# Patient Record
Sex: FEMALE | Race: ASIAN | NOT HISPANIC OR LATINO | URBAN - METROPOLITAN AREA
[De-identification: names, ages, dates, MRNs, and addresses within clinical notes are randomized per-mention and may not be internally consistent; named-entity substitution may affect disease eponyms.]

---

## 2023-05-30 ENCOUNTER — EMERGENCY (EMERGENCY)
Facility: HOSPITAL | Age: 23
LOS: 0 days | Discharge: HOME | End: 2023-05-31
Attending: EMERGENCY MEDICINE
Payer: MEDICAID

## 2023-05-30 DIAGNOSIS — R00.0 TACHYCARDIA, UNSPECIFIED: ICD-10-CM

## 2023-05-30 DIAGNOSIS — R06.00 DYSPNEA, UNSPECIFIED: ICD-10-CM

## 2023-05-30 DIAGNOSIS — J02.9 ACUTE PHARYNGITIS, UNSPECIFIED: ICD-10-CM

## 2023-05-30 DIAGNOSIS — F17.210 NICOTINE DEPENDENCE, CIGARETTES, UNCOMPLICATED: ICD-10-CM

## 2023-05-30 DIAGNOSIS — R91.8 OTHER NONSPECIFIC ABNORMAL FINDING OF LUNG FIELD: ICD-10-CM

## 2023-05-30 DIAGNOSIS — R50.9 FEVER, UNSPECIFIED: ICD-10-CM

## 2023-05-30 DIAGNOSIS — Z86.19 PERSONAL HISTORY OF OTHER INFECTIOUS AND PARASITIC DISEASES: ICD-10-CM

## 2023-05-30 DIAGNOSIS — K11.5 SIALOLITHIASIS: ICD-10-CM

## 2023-05-30 DIAGNOSIS — R09.89 OTHER SPECIFIED SYMPTOMS AND SIGNS INVOLVING THE CIRCULATORY AND RESPIRATORY SYSTEMS: ICD-10-CM

## 2023-05-30 LAB
BASOPHILS # BLD AUTO: 0.08 K/UL — SIGNIFICANT CHANGE UP (ref 0–0.2)
BASOPHILS NFR BLD AUTO: 0.5 % — SIGNIFICANT CHANGE UP (ref 0–1)
EOSINOPHIL # BLD AUTO: 0.02 K/UL — SIGNIFICANT CHANGE UP (ref 0–0.7)
EOSINOPHIL NFR BLD AUTO: 0.1 % — SIGNIFICANT CHANGE UP (ref 0–8)
HCT VFR BLD CALC: 41.3 % — SIGNIFICANT CHANGE UP (ref 37–47)
HGB BLD-MCNC: 13.7 G/DL — SIGNIFICANT CHANGE UP (ref 12–16)
IMM GRANULOCYTES NFR BLD AUTO: 0.4 % — HIGH (ref 0.1–0.3)
LYMPHOCYTES # BLD AUTO: 23.6 % — SIGNIFICANT CHANGE UP (ref 20.5–51.1)
LYMPHOCYTES # BLD AUTO: 3.99 K/UL — HIGH (ref 1.2–3.4)
MCHC RBC-ENTMCNC: 28.7 PG — SIGNIFICANT CHANGE UP (ref 27–31)
MCHC RBC-ENTMCNC: 33.2 G/DL — SIGNIFICANT CHANGE UP (ref 32–37)
MCV RBC AUTO: 86.4 FL — SIGNIFICANT CHANGE UP (ref 81–99)
MONOCYTES # BLD AUTO: 1.16 K/UL — HIGH (ref 0.1–0.6)
MONOCYTES NFR BLD AUTO: 6.9 % — SIGNIFICANT CHANGE UP (ref 1.7–9.3)
NEUTROPHILS # BLD AUTO: 11.62 K/UL — HIGH (ref 1.4–6.5)
NEUTROPHILS NFR BLD AUTO: 68.5 % — SIGNIFICANT CHANGE UP (ref 42.2–75.2)
NRBC # BLD: 0 /100 WBCS — SIGNIFICANT CHANGE UP (ref 0–0)
PLATELET # BLD AUTO: 246 K/UL — SIGNIFICANT CHANGE UP (ref 130–400)
PMV BLD: 9.6 FL — SIGNIFICANT CHANGE UP (ref 7.4–10.4)
RBC # BLD: 4.78 M/UL — SIGNIFICANT CHANGE UP (ref 4.2–5.4)
RBC # FLD: 14.7 % — HIGH (ref 11.5–14.5)
WBC # BLD: 16.93 K/UL — HIGH (ref 4.8–10.8)
WBC # FLD AUTO: 16.93 K/UL — HIGH (ref 4.8–10.8)

## 2023-05-30 PROCEDURE — 70360 X-RAY EXAM OF NECK: CPT

## 2023-05-30 PROCEDURE — 0241U: CPT

## 2023-05-30 PROCEDURE — 99291 CRITICAL CARE FIRST HOUR: CPT | Mod: 25

## 2023-05-30 PROCEDURE — 71045 X-RAY EXAM CHEST 1 VIEW: CPT

## 2023-05-30 PROCEDURE — 99291 CRITICAL CARE FIRST HOUR: CPT

## 2023-05-30 PROCEDURE — 36415 COLL VENOUS BLD VENIPUNCTURE: CPT

## 2023-05-30 PROCEDURE — 80053 COMPREHEN METABOLIC PANEL: CPT

## 2023-05-30 PROCEDURE — 84703 CHORIONIC GONADOTROPIN ASSAY: CPT

## 2023-05-30 PROCEDURE — 96374 THER/PROPH/DIAG INJ IV PUSH: CPT

## 2023-05-30 PROCEDURE — 94640 AIRWAY INHALATION TREATMENT: CPT

## 2023-05-30 PROCEDURE — 70491 CT SOFT TISSUE NECK W/DYE: CPT | Mod: MA

## 2023-05-30 PROCEDURE — 96375 TX/PRO/DX INJ NEW DRUG ADDON: CPT

## 2023-05-30 PROCEDURE — 85025 COMPLETE CBC W/AUTO DIFF WBC: CPT

## 2023-05-30 RX ORDER — IPRATROPIUM/ALBUTEROL SULFATE 18-103MCG
3 AEROSOL WITH ADAPTER (GRAM) INHALATION
Refills: 0 | Status: COMPLETED | OUTPATIENT
Start: 2023-05-30 | End: 2023-05-31

## 2023-05-30 RX ORDER — FAMOTIDINE 10 MG/ML
20 INJECTION INTRAVENOUS ONCE
Refills: 0 | Status: DISCONTINUED | OUTPATIENT
Start: 2023-05-30 | End: 2023-05-30

## 2023-05-30 RX ORDER — FAMOTIDINE 10 MG/ML
20 INJECTION INTRAVENOUS ONCE
Refills: 0 | Status: COMPLETED | OUTPATIENT
Start: 2023-05-30 | End: 2023-05-30

## 2023-05-30 RX ORDER — DIPHENHYDRAMINE HCL 50 MG
50 CAPSULE ORAL ONCE
Refills: 0 | Status: DISCONTINUED | OUTPATIENT
Start: 2023-05-30 | End: 2023-05-30

## 2023-05-30 RX ORDER — EPINEPHRINE 11.25MG/ML
0.5 SOLUTION, NON-ORAL INHALATION ONCE
Refills: 0 | Status: COMPLETED | OUTPATIENT
Start: 2023-05-30 | End: 2023-05-30

## 2023-05-30 RX ADMIN — Medication 125 MILLIGRAM(S): at 23:59

## 2023-05-30 RX ADMIN — Medication 0.5 MILLILITER(S): at 23:59

## 2023-05-31 VITALS
TEMPERATURE: 98 F | HEART RATE: 95 BPM | OXYGEN SATURATION: 100 % | SYSTOLIC BLOOD PRESSURE: 121 MMHG | RESPIRATION RATE: 16 BRPM | DIASTOLIC BLOOD PRESSURE: 74 MMHG

## 2023-05-31 VITALS
DIASTOLIC BLOOD PRESSURE: 65 MMHG | OXYGEN SATURATION: 100 % | SYSTOLIC BLOOD PRESSURE: 107 MMHG | HEART RATE: 85 BPM | RESPIRATION RATE: 18 BRPM

## 2023-05-31 LAB
ALBUMIN SERPL ELPH-MCNC: 4 G/DL — SIGNIFICANT CHANGE UP (ref 3.5–5.2)
ALP SERPL-CCNC: 77 U/L — SIGNIFICANT CHANGE UP (ref 30–115)
ALT FLD-CCNC: 22 U/L — SIGNIFICANT CHANGE UP (ref 0–41)
ANION GAP SERPL CALC-SCNC: 12 MMOL/L — SIGNIFICANT CHANGE UP (ref 7–14)
AST SERPL-CCNC: 45 U/L — HIGH (ref 0–41)
BILIRUB SERPL-MCNC: 0.4 MG/DL — SIGNIFICANT CHANGE UP (ref 0.2–1.2)
BUN SERPL-MCNC: 6 MG/DL — LOW (ref 10–20)
CALCIUM SERPL-MCNC: 8.8 MG/DL — SIGNIFICANT CHANGE UP (ref 8.4–10.5)
CHLORIDE SERPL-SCNC: 100 MMOL/L — SIGNIFICANT CHANGE UP (ref 98–110)
CO2 SERPL-SCNC: 20 MMOL/L — SIGNIFICANT CHANGE UP (ref 17–32)
CREAT SERPL-MCNC: 0.5 MG/DL — LOW (ref 0.7–1.5)
EGFR: 137 ML/MIN/1.73M2 — SIGNIFICANT CHANGE UP
FLUAV AG NPH QL: SIGNIFICANT CHANGE UP
FLUBV AG NPH QL: SIGNIFICANT CHANGE UP
GLUCOSE SERPL-MCNC: 112 MG/DL — HIGH (ref 70–99)
HCG SERPL QL: NEGATIVE — SIGNIFICANT CHANGE UP
POTASSIUM SERPL-MCNC: 6.1 MMOL/L — CRITICAL HIGH (ref 3.5–5)
POTASSIUM SERPL-SCNC: 6.1 MMOL/L — CRITICAL HIGH (ref 3.5–5)
PROT SERPL-MCNC: 7.7 G/DL — SIGNIFICANT CHANGE UP (ref 6–8)
RSV RNA NPH QL NAA+NON-PROBE: SIGNIFICANT CHANGE UP
SARS-COV-2 RNA SPEC QL NAA+PROBE: SIGNIFICANT CHANGE UP
SODIUM SERPL-SCNC: 132 MMOL/L — LOW (ref 135–146)

## 2023-05-31 PROCEDURE — 70360 X-RAY EXAM OF NECK: CPT | Mod: 26

## 2023-05-31 PROCEDURE — 71045 X-RAY EXAM CHEST 1 VIEW: CPT | Mod: 26

## 2023-05-31 PROCEDURE — 70491 CT SOFT TISSUE NECK W/DYE: CPT | Mod: 26,MA

## 2023-05-31 RX ORDER — KETOROLAC TROMETHAMINE 30 MG/ML
15 SYRINGE (ML) INJECTION ONCE
Refills: 0 | Status: DISCONTINUED | OUTPATIENT
Start: 2023-05-31 | End: 2023-05-31

## 2023-05-31 RX ORDER — SODIUM CHLORIDE 9 MG/ML
3 INJECTION INTRAMUSCULAR; INTRAVENOUS; SUBCUTANEOUS ONCE
Refills: 0 | Status: COMPLETED | OUTPATIENT
Start: 2023-05-31 | End: 2023-05-31

## 2023-05-31 RX ADMIN — Medication 3 MILLILITER(S): at 00:13

## 2023-05-31 RX ADMIN — Medication 600 MILLIGRAM(S): at 04:29

## 2023-05-31 RX ADMIN — Medication 1 TABLET(S): at 05:53

## 2023-05-31 RX ADMIN — SODIUM CHLORIDE 3 MILLILITER(S): 9 INJECTION INTRAMUSCULAR; INTRAVENOUS; SUBCUTANEOUS at 01:54

## 2023-05-31 RX ADMIN — FAMOTIDINE 20 MILLIGRAM(S): 10 INJECTION INTRAVENOUS at 00:16

## 2023-05-31 RX ADMIN — Medication 3 MILLILITER(S): at 00:00

## 2023-05-31 RX ADMIN — Medication 15 MILLIGRAM(S): at 02:00

## 2023-05-31 RX ADMIN — Medication 15 MILLIGRAM(S): at 01:27

## 2023-05-31 RX ADMIN — Medication 3 MILLILITER(S): at 00:31

## 2023-05-31 NOTE — ED PROVIDER NOTE - PROGRESS NOTE DETAILS
FF: I discussed radiology and lab results with pt. pt results are preliminary and I discussed with pt if reading has changed she will be contact. pt advised of strict return precautions discussed at bedside. advised to f/u with pulmonology and ent. pt made aware of paranasal sinus disease, Left submandibular sialolithiasis,  Paranasal sinus disease, 8mm focal groundglass opacity/nodule in the right upper lobe. agreeable to dc.

## 2023-05-31 NOTE — ED PROVIDER NOTE - CRITICAL CARE ATTENDING CONTRIBUTION TO CARE
I agree with the PA documentation and have performed the substantiate of amount of history, physical exam, medical decision making. I personally evaluated patient. I agree with the findings and plan with all documentation on chart except as documented  in my note.    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  Patient reports that for the past 2 days she had a fever and a sore throat with a runny nose.  Patient was seen by PCP this morning was started on Keflex.  She took her first dose around 12 PM and all of her symptoms started 8 hours later at 8 PM.  Patient felt like she could not breathe.  Patient also had stridor and difficulty swallowing.  She denies any skin rash, abdominal pain, vomiting, lip or tongue swelling.  No other exposures.  Redness and skin rash around face is unchanged from her chronic condition.    Patient was significantly tachycardic to the 150s and in respiratory distress and resting to critical care emergency department.  Patient was not wheezing and had no swelling to the tongue or floor of mouth.  Concern for possible stridor.  Large-bore IV access obtained and patient given IV steroids and nebulizer treatments.  Patient given racemic epi.  Will get chest x-ray and soft tissue neck x-ray.  We will also get CT scan soft tissue neck with contrast to evaluate for possible epiglottitis versus retropharyngeal abscess.  Patient improving with ED care and will continue to monitor.  We will follow-up work-up and reassess.

## 2023-05-31 NOTE — ED ADULT NURSE NOTE - OBJECTIVE STATEMENT
Patient c/o sob around 8 pm. Patient visited PMD today for sore throat and took Keflex (new medicine) at 12pm, starting to feel difficulty breathing around 8pm, denies of itchiness or rash.

## 2023-05-31 NOTE — ED PROVIDER NOTE - PHYSICAL EXAMINATION
Physical Exam    Vital Signs: I have reviewed the initial vital signs.  Constitutional: well-nourished, appears stated age, no acute distress  Eyes: Conjunctiva pink, Sclera clear, PERRLA, EOMI without pain.   ENT: Oropharynx is clear without lesions. uvula midline. no tonsillar erythema, edema, or exudates. no stridor. no pta. floor of the mouth is soft without pus. (+) pt has hoarse voice. no trismus. no drooling. no tripoding. no goiter.   Cardiovascular: (+) tachycardic, regular rhythm, well-perfused extremities, radial pulses equal and 2+ b/l.   Respiratory: (+) labored respiratory effort, clear to auscultation bilaterally no wheezing, rales and rhonchi. no accessory muscle use.   Gastrointestinal: soft, non-tender, nondistended abdomen, no pulsatile mass, normal bowl sounds, no rebound, no guarding  Musculoskeletal: supple neck, no lower extremity edema, no calf tenderness  Integumentary: warm, dry, no rash  Neurologic: awake, alert, steady gait.   Psychiatric: appropriate mood, appropriate affect

## 2023-05-31 NOTE — ED PROVIDER NOTE - CLINICAL SUMMARY MEDICAL DECISION MAKING FREE TEXT BOX
Labs and EKG were ordered and reviewed, where indicated.  Imaging was ordered and reviewed by me, where indicated.  Appropriate medications for patient's presenting complaints were ordered and effects were reassessed, where indicated.  Patient's records (prior hospital, ED visit, and/or nursing home note) were reviewed, if available.  Additional history was obtained from EMS, family, and/or PCP (where available).  Escalation to admission/observation was considered.  However patient feels much better and patient/parent is comfortable with discharge.  Appropriate follow-up was arranged.     Authored by Dr. Kaylah Cornejo: s/o to me by dr. hall - fever, cp, sob s/p 1 dose keflex as prescribed by pcp, some stridor on exam, allergy meds incl racemic epi neb given, labs sig for wbc 16, cxr/soft tissue neg no obvious findings, s/o to me pending ct soft tissue neck which showed sialolithiasis, pulm nodule & other findings - augmentin, rec sialogogues, pt reassessed airway intact tolerating po - all results d/w pt & copies given, strict return precautions discussed, rec outpt ent/pulm f/u

## 2023-05-31 NOTE — ED POST DISCHARGE NOTE - RESULT SUMMARY
CT NECK- LARYNGEAL/TRACHEAL INFLAMMATION ON FINAL. CASE DISCUSSED WITH DR. BLUM. CALLED PATIENT AND FEELS 100 % BETTER. NO TROUBLE BREATHING OR SWALLOWING. ADVISED TO RETURN TO ED ASAP IF FEELS WORSE AT ANY TME.

## 2023-05-31 NOTE — ED ADULT NURSE NOTE - NSFALLUNIVINTERV_ED_ALL_ED
Bed/Stretcher in lowest position, wheels locked, appropriate side rails in place/Call bell, personal items and telephone in reach/Instruct patient to call for assistance before getting out of bed/chair/stretcher/Non-slip footwear applied when patient is off stretcher/[place] to call system/Physically safe environment - no spills, clutter or unnecessary equipment/Purposeful proactive rounding/Room/bathroom lighting operational, light cord in reach

## 2023-05-31 NOTE — ED PROVIDER NOTE - PATIENT PORTAL LINK FT
You can access the FollowMyHealth Patient Portal offered by [facility] by registering at the following website: [email or web address]. By joining [email or web address]’s FollowMyHealth portal, you will also be able to view your health information using other applications (apps) compatible with our system.

## 2023-05-31 NOTE — ED PROVIDER NOTE - NSFOLLOWUPINSTRUCTIONS_ED_ALL_ED_FT
Salivary Stone  Side view of a person's head showing the parotid, sublingual, and submandibular salivary glands.  A salivary stone is a small cluster of mineral (mineral deposit) that builds up in the tubes (ducts) that drain the salivary glands. Most salivary stones are made of calcium. When a stone forms, saliva can back up into the gland and cause painful swelling.    Your salivary glands are the glands that make saliva. You have six major salivary glands. Each gland has a duct that carries saliva into your mouth. Saliva keeps your mouth moist and breaks down the food that you eat. It also helps prevent tooth decay.    Two salivary glands are found just in front of your ears (parotid). The ducts for these glands open up inside your cheeks, near your back teeth. You also have two glands under your tongue (sublingual) and two glands under your jaw (submandibular). The ducts for these glands open under your tongue. A stone can form in any salivary gland. The most common place for a salivary stone to form is in a submandibular salivary gland.    What are the causes?  Salivary stones may be caused by any condition that lessens the flow of saliva. It is not known why some people get stones.    What increases the risk?  You are more likely to develop this condition if:  You do not drink enough water.  You smoke.  You have any of these:  High blood pressure.  Gout.  Diabetes.  What are the signs or symptoms?  The main sign of a salivary stone is sudden swelling of a salivary gland during eating. This usually happens under the jaw on one side. Other signs and symptoms may include:  Swelling of the cheek or under the tongue during eating.  Pain in the swollen area.  Trouble chewing or swallowing.  Swelling that goes down after eating.  Sometimes, the salivary stone may be seen. The stone is oval in shape and may be white or yellow in color.    How is this diagnosed?  This condition may be diagnosed based on:  Your signs and symptoms.  A physical exam. In many cases, your health care provider will be able to feel the stone in a duct inside your mouth.  Imaging studies, such as:  X-rays.  Ultrasound.  CT scan.  MRI.  You may need to see an ear, nose, and throat specialist (ENT or otolaryngologist) for diagnosis and treatment.    How is this treated?  Treatment for this condition depends on the size of the stone.  A small stone that is not causing symptoms may be treated with home care.  A stone that is large enough to cause symptoms may be treated by:  Probing and widening of the duct to let the stone pass.  Putting a thin, flexible scope (endoscope) into the duct to find and remove the stone.  Breaking up the stone with sound waves.  Removing the whole salivary gland.  Follow these instructions at home:  To relieve discomfort    Take NSAIDs, such as ibuprofen, to help relieve pain and swelling as told by your health care provider.  Follow these instructions every few hours:  Suck on a lemon candy or a vitamin C lozenge to prompt the flow of saliva.  Put a warm, damp cloth (warmcompress) over the gland.  Gently massage the gland.  General instructions    A sign showing that a person should not smoke.  Take over-the-counter and prescription medicines only as told by your health care provider.  Drink enough fluid to keep your urine pale yellow.  Do not use any products that contain nicotine or tobacco. These products include cigarettes, chewing tobacco, and vaping devices, such as e-cigarettes. If you need help quitting, ask your health care provider.  Keep all follow-up visits. This is important.  Contact a health care provider if:  You have pain and swelling in your face, jaw, or mouth after eating.  You keep having swelling in any of these places:  In front of your ear.  Under your jaw.  Inside your mouth.  Get help right away if:  You have pain and swelling in your face, jaw, or mouth, that suddenly gets worse.  Your pain and swelling make it hard to swallow, talk, or breathe.  These symptoms may be an emergency. Get help right away. Call 911.  Do not wait to see if the symptoms will go away.  Do not drive yourself to the hospital.  Summary  A salivary stone is a small clump of mineral (mineral deposit) that builds up in the ducts that drain your salivary glands.  When a stone forms, saliva can back up into the gland and cause painful swelling.  Salivary stones may be caused by any condition that lessens the flow of saliva.  Treatment for this condition depends on the size of the stone.  This information is not intended to replace advice given to you by your health care provider. Make sure you discuss any questions you have with your health care provider.

## 2023-05-31 NOTE — ED PROVIDER NOTE - OBJECTIVE STATEMENT
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 pt reports for two days she had fever, and sore throat associated with runny nose. pt reports she was seen by her PCP this morning and prescribed keflex. pt reports she took the first dose of kelfex at 12pm with no issue. pt reports around 8pm she felt as if she couldn't breathe. pt reports difficulty swallowing. pt is unsure if she has ever taken keflex before. pt denies itchiness, abdominal pain, n/v/d/c, lip swelling, tongue swelling, rashes, hx of allergies, new food in the diet, new clothes/detergent/bodywash/shampoo/perfumes, cigarette smoking, leg pain, leg swelling, recent travel, recent sick contacts, recent hospitalizations, hx of cancer, use of hormones, hx of blood clots, or weakness.

## 2023-05-31 NOTE — ED PROVIDER NOTE - NSPTACCESSSVCSAPPTDETAILS_ED_ALL_ED_FT
pt has left submandibular sialolithaisis     ALSO NEEDS PULM FOLLOW UP FOR right lung opacity/nodule

## 2023-05-31 NOTE — ED PROVIDER NOTE - PROVIDER TOKENS
PROVIDER:[TOKEN:[[id number]],FOLLOWUP:[7-10 Days]],PROVIDER:[TOKEN:[[id number]],FOLLOWUP:[Routine]]

## 2023-05-31 NOTE — ED PROVIDER NOTE - CARE PLAN
1 Principal Discharge DX:	Sialolithiasis of submandibular gland  Secondary Diagnosis:	Opacity of lung on imaging study

## 2023-05-31 NOTE — ED PROVIDER NOTE - CARE PROVIDER_API CALL
Nishant Coffman  Otolaryngology  [address] [place] [address]  Phone: [phone]  Fax: [phone]  Follow Up Time: 7-10 Days    Edmundo Lewis  Pulmonary Disease  [address] [place] [address]  Phone: [phone]  Fax: [phone]  Follow Up Time: Routine

## 2023-06-27 PROBLEM — Z00.00 ENCOUNTER FOR PREVENTIVE HEALTH EXAMINATION: Status: ACTIVE | Noted: 2023-06-27

## 2023-08-22 ENCOUNTER — APPOINTMENT (OUTPATIENT)
Dept: OTOLARYNGOLOGY | Facility: CLINIC | Age: 23
End: 2023-08-22
Payer: MEDICAID

## 2023-08-22 VITALS — WEIGHT: 130 LBS | HEIGHT: 62.99 IN | BODY MASS INDEX: 23.04 KG/M2

## 2023-08-22 DIAGNOSIS — R09.81 NASAL CONGESTION: ICD-10-CM

## 2023-08-22 DIAGNOSIS — J34.89 OTHER SPECIFIED DISORDERS OF NOSE AND NASAL SINUSES: ICD-10-CM

## 2023-08-22 PROCEDURE — 99204 OFFICE O/P NEW MOD 45 MIN: CPT | Mod: 25

## 2023-08-22 PROCEDURE — 31231 NASAL ENDOSCOPY DX: CPT

## 2023-08-22 RX ORDER — FLUTICASONE PROPIONATE 50 UG/1
50 SPRAY, METERED NASAL
Qty: 2 | Refills: 2 | Status: ACTIVE | COMMUNITY
Start: 2023-08-22 | End: 1900-01-01

## 2023-08-22 RX ORDER — LEVOFLOXACIN 500 MG/1
500 TABLET, FILM COATED ORAL
Qty: 5 | Refills: 0 | Status: ACTIVE | COMMUNITY
Start: 2023-08-22 | End: 1900-01-01

## 2023-08-22 NOTE — ASSESSMENT
[FreeTextEntry1] : Possible need for CT if no total improvement I reviewed, interpreted, and discussed the Audiogram done today.RTC in 2 weeks.

## 2023-08-22 NOTE — HISTORY OF PRESENT ILLNESS
[de-identified] : Patient presents today c/o runny nose .  Has been having clear runny nose for months . No history of allergies.  She  denies any nasal  itchiness .  Not using nasal sprays or allergy medications. Has green mucus discharge when blowing nose.  In June was seen in ED for runny nose and swollen throat with difficulty breathing about 2 months. Was prescribed Amoxicillin-clavulanate 875 gm for 7 days and methylprednisone. She had improvement with the throat but not improvement with the nose, still was having green discharge. Has postnasal drip with no sore throat. Feels sinus congestion.

## 2023-08-25 LAB — BACTERIA NOSE AEROBE CULT: NORMAL

## 2023-08-30 ENCOUNTER — APPOINTMENT (OUTPATIENT)
Dept: PULMONOLOGY | Facility: CLINIC | Age: 23
End: 2023-08-30

## 2023-09-13 ENCOUNTER — APPOINTMENT (OUTPATIENT)
Dept: OTOLARYNGOLOGY | Facility: CLINIC | Age: 23
End: 2023-09-13